# Patient Record
Sex: FEMALE | Race: WHITE | Employment: OTHER | URBAN - METROPOLITAN AREA
[De-identification: names, ages, dates, MRNs, and addresses within clinical notes are randomized per-mention and may not be internally consistent; named-entity substitution may affect disease eponyms.]

---

## 2022-08-03 PROBLEM — Z94.0 RENAL TRANSPLANT RECIPIENT: Status: ACTIVE | Noted: 2022-08-03

## 2022-08-03 PROBLEM — N18.30 CKD (CHRONIC KIDNEY DISEASE), STAGE III (HCC): Status: ACTIVE | Noted: 2022-08-03

## 2022-08-03 PROBLEM — I42.8 NON-ISCHEMIC CARDIOMYOPATHY (HCC): Status: ACTIVE | Noted: 2022-08-03

## 2022-08-03 PROBLEM — E11.42 DIABETIC POLYNEUROPATHY (HCC): Status: ACTIVE | Noted: 2022-08-03

## 2022-08-03 PROBLEM — I10 PRIMARY HYPERTENSION: Status: ACTIVE | Noted: 2022-08-03

## 2022-08-03 PROBLEM — E11.29 TYPE 2 DIABETES MELLITUS WITH RENAL COMPLICATION (HCC): Status: ACTIVE | Noted: 2022-08-03

## 2022-11-09 PROBLEM — N18.30 ANEMIA IN STAGE 3 CHRONIC KIDNEY DISEASE (HCC): Status: ACTIVE | Noted: 2022-11-09

## 2022-11-09 PROBLEM — D63.1 ANEMIA IN STAGE 3 CHRONIC KIDNEY DISEASE (HCC): Status: ACTIVE | Noted: 2022-11-09

## 2023-03-21 RX ORDER — TACROLIMUS 1 MG/1
1 CAPSULE ORAL 2 TIMES DAILY
Qty: 90 CAPSULE | Refills: 3 | OUTPATIENT
Start: 2023-03-21 | End: 2023-06-19

## 2023-10-04 PROBLEM — M81.0 AGE-RELATED OSTEOPOROSIS WITHOUT CURRENT PATHOLOGICAL FRACTURE: Status: ACTIVE | Noted: 2023-10-04

## 2024-02-08 ENCOUNTER — APPOINTMENT (OUTPATIENT)
Dept: GENERAL RADIOLOGY | Age: 69
End: 2024-02-08
Payer: COMMERCIAL

## 2024-02-08 ENCOUNTER — HOSPITAL ENCOUNTER (EMERGENCY)
Age: 69
Discharge: HOME | End: 2024-02-08
Attending: EMERGENCY MEDICINE
Payer: COMMERCIAL

## 2024-02-08 VITALS
HEART RATE: 80 BPM | RESPIRATION RATE: 14 BRPM | TEMPERATURE: 99 F | SYSTOLIC BLOOD PRESSURE: 108 MMHG | DIASTOLIC BLOOD PRESSURE: 50 MMHG | BODY MASS INDEX: 26.43 KG/M2 | OXYGEN SATURATION: 95 % | WEIGHT: 140 LBS | HEIGHT: 61 IN

## 2024-02-08 DIAGNOSIS — N30.01 ACUTE CYSTITIS WITH HEMATURIA: Primary | ICD-10-CM

## 2024-02-08 LAB
ALBUMIN SERPL-MCNC: 3.3 G/DL (ref 3.5–5.2)
ALBUMIN/GLOB SERPL: 1.7 {RATIO} (ref 1–2.5)
ALP SERPL-CCNC: 53 U/L (ref 35–104)
ALT SERPL-CCNC: 6 U/L (ref 5–33)
ANION GAP SERPL CALCULATED.3IONS-SCNC: 11 MMOL/L (ref 9–17)
AST SERPL-CCNC: 18 U/L
BACTERIA URNS QL MICRO: ABNORMAL
BASOPHILS # BLD: 0 K/UL (ref 0–0.2)
BASOPHILS NFR BLD: 0 % (ref 0–2)
BILIRUB SERPL-MCNC: 0.5 MG/DL (ref 0.3–1.2)
BILIRUB UR QL STRIP: NEGATIVE
BUN SERPL-MCNC: 21 MG/DL (ref 8–23)
CALCIUM SERPL-MCNC: 8 MG/DL (ref 8.6–10.4)
CHARACTER UR: ABNORMAL
CHLORIDE SERPL-SCNC: 105 MMOL/L (ref 98–107)
CLARITY UR: CLEAR
CO2 SERPL-SCNC: 25 MMOL/L (ref 20–31)
COLOR UR: YELLOW
CREAT SERPL-MCNC: 1.4 MG/DL (ref 0.5–0.9)
EOSINOPHIL # BLD: 0 K/UL (ref 0–0.4)
EOSINOPHILS RELATIVE PERCENT: 1 % (ref 1–4)
EPI CELLS #/AREA URNS HPF: ABNORMAL /HPF (ref 0–5)
ERYTHROCYTE [DISTWIDTH] IN BLOOD BY AUTOMATED COUNT: 14.1 % (ref 12.5–15.4)
FLUAV AG SPEC QL: NEGATIVE
FLUBV AG SPEC QL: NEGATIVE
GFR SERPL CREATININE-BSD FRML MDRD: 41 ML/MIN/1.73M2
GLUCOSE BLD-MCNC: 106 MG/DL (ref 65–105)
GLUCOSE SERPL-MCNC: 98 MG/DL (ref 70–99)
GLUCOSE UR STRIP-MCNC: NEGATIVE MG/DL
HCT VFR BLD AUTO: 29 % (ref 36–46)
HGB BLD-MCNC: 9.4 G/DL (ref 12–16)
HGB UR QL STRIP.AUTO: ABNORMAL
KETONES UR STRIP-MCNC: NEGATIVE MG/DL
LACTATE BLDV-SCNC: 2.3 MMOL/L (ref 0.5–2.2)
LEUKOCYTE ESTERASE UR QL STRIP: ABNORMAL
LYMPHOCYTES NFR BLD: 0.8 K/UL (ref 1–4.8)
LYMPHOCYTES RELATIVE PERCENT: 14 % (ref 24–44)
MAGNESIUM SERPL-MCNC: 1.5 MG/DL (ref 1.6–2.6)
MCH RBC QN AUTO: 27.7 PG (ref 26–34)
MCHC RBC AUTO-ENTMCNC: 32.6 G/DL (ref 31–37)
MCV RBC AUTO: 84.9 FL (ref 80–100)
MONOCYTES NFR BLD: 0.2 K/UL (ref 0.1–1.2)
MONOCYTES NFR BLD: 4 % (ref 2–11)
NEUTROPHILS NFR BLD: 81 % (ref 36–66)
NEUTS SEG NFR BLD: 4.5 K/UL (ref 1.8–7.7)
NITRITE UR QL STRIP: NEGATIVE
PH UR STRIP: 6 [PH] (ref 5–8)
PLATELET # BLD AUTO: 109 K/UL (ref 140–450)
PMV BLD AUTO: 8.1 FL (ref 6–12)
POTASSIUM SERPL-SCNC: 3.1 MMOL/L (ref 3.7–5.3)
PROT SERPL-MCNC: 5.2 G/DL (ref 6.4–8.3)
PROT UR STRIP-MCNC: ABNORMAL MG/DL
RBC # BLD AUTO: 3.41 M/UL (ref 4–5.2)
RBC #/AREA URNS HPF: ABNORMAL /HPF (ref 0–2)
SARS-COV-2 RDRP RESP QL NAA+PROBE: NOT DETECTED
SODIUM SERPL-SCNC: 141 MMOL/L (ref 135–144)
SP GR UR STRIP: 1.01 (ref 1–1.03)
SPECIMEN DESCRIPTION: NORMAL
UROBILINOGEN UR STRIP-ACNC: NORMAL EU/DL (ref 0–1)
WBC #/AREA URNS HPF: ABNORMAL /HPF (ref 0–5)
WBC OTHER # BLD: 5.5 K/UL (ref 3.5–11)

## 2024-02-08 PROCEDURE — 96368 THER/DIAG CONCURRENT INF: CPT

## 2024-02-08 PROCEDURE — 87804 INFLUENZA ASSAY W/OPTIC: CPT

## 2024-02-08 PROCEDURE — 81001 URINALYSIS AUTO W/SCOPE: CPT

## 2024-02-08 PROCEDURE — 6370000000 HC RX 637 (ALT 250 FOR IP)

## 2024-02-08 PROCEDURE — 2580000003 HC RX 258

## 2024-02-08 PROCEDURE — 83735 ASSAY OF MAGNESIUM: CPT

## 2024-02-08 PROCEDURE — 96361 HYDRATE IV INFUSION ADD-ON: CPT

## 2024-02-08 PROCEDURE — 87086 URINE CULTURE/COLONY COUNT: CPT

## 2024-02-08 PROCEDURE — 83605 ASSAY OF LACTIC ACID: CPT

## 2024-02-08 PROCEDURE — 85025 COMPLETE CBC W/AUTO DIFF WBC: CPT

## 2024-02-08 PROCEDURE — 2500000003 HC RX 250 WO HCPCS

## 2024-02-08 PROCEDURE — 82947 ASSAY GLUCOSE BLOOD QUANT: CPT

## 2024-02-08 PROCEDURE — 96366 THER/PROPH/DIAG IV INF ADDON: CPT

## 2024-02-08 PROCEDURE — 96375 TX/PRO/DX INJ NEW DRUG ADDON: CPT

## 2024-02-08 PROCEDURE — 99284 EMERGENCY DEPT VISIT MOD MDM: CPT

## 2024-02-08 PROCEDURE — 87186 SC STD MICRODIL/AGAR DIL: CPT

## 2024-02-08 PROCEDURE — 71045 X-RAY EXAM CHEST 1 VIEW: CPT

## 2024-02-08 PROCEDURE — 87088 URINE BACTERIA CULTURE: CPT

## 2024-02-08 PROCEDURE — 96365 THER/PROPH/DIAG IV INF INIT: CPT

## 2024-02-08 PROCEDURE — 87635 SARS-COV-2 COVID-19 AMP PRB: CPT

## 2024-02-08 PROCEDURE — A4216 STERILE WATER/SALINE, 10 ML: HCPCS

## 2024-02-08 PROCEDURE — 6360000002 HC RX W HCPCS

## 2024-02-08 PROCEDURE — 80053 COMPREHEN METABOLIC PANEL: CPT

## 2024-02-08 RX ORDER — LANOLIN ALCOHOL/MO/W.PET/CERES
400 CREAM (GRAM) TOPICAL ONCE
Status: COMPLETED | OUTPATIENT
Start: 2024-02-08 | End: 2024-02-08

## 2024-02-08 RX ORDER — ONDANSETRON 4 MG/1
4 TABLET, ORALLY DISINTEGRATING ORAL 3 TIMES DAILY PRN
Qty: 21 TABLET | Refills: 0 | Status: SHIPPED | OUTPATIENT
Start: 2024-02-08

## 2024-02-08 RX ORDER — 0.9 % SODIUM CHLORIDE 0.9 %
1000 INTRAVENOUS SOLUTION INTRAVENOUS ONCE
Status: COMPLETED | OUTPATIENT
Start: 2024-02-08 | End: 2024-02-08

## 2024-02-08 RX ORDER — POTASSIUM CHLORIDE 20 MEQ/1
40 TABLET, EXTENDED RELEASE ORAL ONCE
Status: COMPLETED | OUTPATIENT
Start: 2024-02-08 | End: 2024-02-08

## 2024-02-08 RX ORDER — ONDANSETRON 2 MG/ML
4 INJECTION INTRAMUSCULAR; INTRAVENOUS ONCE
Status: COMPLETED | OUTPATIENT
Start: 2024-02-08 | End: 2024-02-08

## 2024-02-08 RX ORDER — POTASSIUM CHLORIDE 7.45 MG/ML
10 INJECTION INTRAVENOUS
Status: COMPLETED | OUTPATIENT
Start: 2024-02-08 | End: 2024-02-08

## 2024-02-08 RX ORDER — CEPHALEXIN 250 MG/1
250 CAPSULE ORAL 4 TIMES DAILY
Qty: 40 CAPSULE | Refills: 0 | Status: SHIPPED | OUTPATIENT
Start: 2024-02-08 | End: 2024-02-18

## 2024-02-08 RX ADMIN — Medication 10 MEQ: at 13:50

## 2024-02-08 RX ADMIN — SODIUM CHLORIDE 1000 ML: 9 INJECTION, SOLUTION INTRAVENOUS at 13:49

## 2024-02-08 RX ADMIN — POTASSIUM CHLORIDE 40 MEQ: 1500 TABLET, EXTENDED RELEASE ORAL at 13:47

## 2024-02-08 RX ADMIN — Medication 400 MG: at 17:26

## 2024-02-08 RX ADMIN — FAMOTIDINE 20 MG: 10 INJECTION, SOLUTION INTRAVENOUS at 13:18

## 2024-02-08 RX ADMIN — Medication 10 MEQ: at 16:21

## 2024-02-08 RX ADMIN — ONDANSETRON 4 MG: 2 INJECTION INTRAMUSCULAR; INTRAVENOUS at 13:18

## 2024-02-08 RX ADMIN — CEFTRIAXONE SODIUM 1000 MG: 1 INJECTION, POWDER, FOR SOLUTION INTRAMUSCULAR; INTRAVENOUS at 17:28

## 2024-02-08 ASSESSMENT — ENCOUNTER SYMPTOMS
NAUSEA: 1
RESPIRATORY NEGATIVE: 1
VOMITING: 1
ALLERGIC/IMMUNOLOGIC NEGATIVE: 1
EYES NEGATIVE: 1
DIARRHEA: 1

## 2024-02-08 NOTE — ED PROVIDER NOTES
[facility] EMERGENCY DEPARTMENT  Emergency Department Encounter  Mid Level Provider     Pt Name: Alesia Cotton  MRN: [id number]  Birthdate 1955  Date of evaluation: 24  PCP:  Fernando Walker MD    CHIEF COMPLAINT       Chief Complaint   Patient presents with    Emesis    Diarrhea    Hypoglycemia     Hx diabetes; Vomiting and diarrhea since last night along w/ low blood suger.        HISTORY OF PRESENT ILLNESS  (Location/Symptom, Timing/Onset,Context/Setting, Quality, Duration, Modifying Factors, Severity.)      Alesia Cotton is a 68 y.o. female who presents with complaints of 24-hour onset of nausea, vomiting, diarrhea.  She does endorse that 2 days prior to this she did have some dysuria but she has not noticed today..  She reports history of type 1 diabetes and notes that her blood sugar has been running lower for her in the 50s to 60s she has had some associated hypotension as well which prompted the  to call EMS to have her checked out.  Prior to arrival she reports received approximately 1 L of normal saline and was also given some IV dextrose for hypoglycemia her blood sugar was in the 50s but came up into the upper 60s and 70s prior to her arrival.  Presently she denies any nausea just feels fatigued from her multiple episodes of vomiting and diarrhea over the past 24 hours.    She denies any associated chest pain, shortness of breath, abdominal pain, fevers, chills, headaches or vision changes.  PAST MEDICAL /SURGICAL / SOCIAL / FAMILY HISTORY      has a past medical history of Age-related osteoporosis without current pathological fracture, Anemia in stage 3 chronic kidney disease (HCC), CKD (chronic kidney disease), stage III (HCC),  donor renal transplant recipient, Diabetic polyneuropathy (HCC), Non-ischemic cardiomyopathy (HCC), Primary hypertension, Renal transplant recipient, and Type 2 diabetes mellitus with renal complication ([facility]).     has a past 
[facility] Emergency Department  [address]  [place] [address]  Phone: [phone]  Fax: [phone]      Attending Physician Attestation    I performed a history and physical examination of the patient and discussed management with the mid level provideer. I reviewed the mid level provider's note and agree with the documented findings and plan of care. Any areas of disagreement are noted on the chart. I was personally present for the key portions of any procedures. I have documented in the chart those procedures where I was not present during the key portions. I have reviewed the emergency nurses triage note. I agree with the chief complaint, past medical history, past surgical history, allergies, medications, social and family history as documented unless otherwise noted below. Documentation of the HPI, Physical Exam and Medical Decision Making performed by mid level providers is based on my personal performance of the HPI, PE and MDM. For Physician Assistant/ Nurse Practitioner cases/documentation I have personally evaluated this patient and have completed at least one if not all key elements of the E/M (history, physical exam, and MDM). Additional findings are as noted.      CHIEF COMPLAINT       Chief Complaint   Patient presents with    Emesis    Diarrhea    Hypoglycemia     Hx diabetes; Vomiting and diarrhea since last night along w/ low blood suger.          PAST MEDICAL HISTORY    has a past medical history of Age-related osteoporosis without current pathological fracture, Anemia in stage 3 chronic kidney disease (HCC), CKD (chronic kidney disease), stage III (HCC),  donor renal transplant recipient, Diabetic polyneuropathy (HCC), Non-ischemic cardiomyopathy (HCC), Primary hypertension, Renal transplant recipient, and Type 2 diabetes mellitus with renal complication (HCC).    SURGICAL HISTORY      has a past surgical history that includes Hip fracture surgery; Ankle 
eMERGENCY dEPARTMENT eNCOUnter   Independent Attestation     Pt Name: Alesia Cotton  MRN: [id number]  Birthdate 1955  Date of evaluation: 2/8/24     Alesia Cotton is a 68 y.o. female with CC: Emesis, Diarrhea, and Hypoglycemia (Hx diabetes; Vomiting and diarrhea since last night along w/ low blood suger. )      Based on the medical record the care appears appropriate.  I was personally available for consultation in the Emergency Department.    Misha Cope MD  Attending Emergency Physician                Misha Cope MD  02/08/24 [id number]    
no

## 2024-02-08 NOTE — DISCHARGE INSTRUCTIONS
Take your medication as indicated and prescribed.  If you are given an antibiotic then, make sure you get the prescription filled and take the antibiotics until finished.  Drink plenty of water while taking the antibiotics.  Avoid drinking alcohol or drinks that have caffeine in it while taking antibiotics.   If you were given the medication pyridium (or take over the counter Azo) - do not wear any contacts for the next week since this medication will turn your tears an orange color and will stain the contacts.      For pain use acetaminophen (Tylenol) or ibuprofen (Motrin / Advil), unless prescribed medications that have acetaminophen or ibuprofen (or similar medications) in it.  You can take over the counter acetaminophen tablets (1 - 2 tablets of the 500-mg strength every 6 hours) or ibuprofen tablets (2 tablets every 4 hours).    PLEASE RETURN TO THE EMERGENCY DEPARTMENT IMMEDIATELY for worsening symptoms, inability to urinate, worsening of blood in your urine, or if you develop any concerning symptoms such as: high fever not relieved by acetaminophen (Tylenol) and/or ibuprofen (Motrin / Advil), chills, shortness of breath, chest pain, feeling of your heart fluttering or racing, persistent nausea and/or vomiting, vomiting up blood, blood in your stool, loss of consciousness, numbness, weakness or tingling in the arms or legs or change in color of the extremities, changes in mental status, persistent headache, blurry vision, loss of bladder / bowel.

## 2024-02-10 LAB
MICROORGANISM SPEC CULT: ABNORMAL
SPECIMEN DESCRIPTION: ABNORMAL

## 2025-03-08 ENCOUNTER — CLINICAL DOCUMENTATION (OUTPATIENT)
Dept: NEPHROLOGY | Age: 70
End: 2025-03-08

## 2025-03-08 NOTE — PROGRESS NOTES
Discussed with patient low potassium levels.  She believes they were drawn more when she had the acute GI illness.  She believes her oral intake has improved she is gained some weight and expects her potassium to have improved as well.  She is going get lab work done this week.  Will review that and decide if potassium supplements are needed.  In the meantime she will continue magnesium supplements as before.

## 2025-03-21 PROBLEM — B25.8 OTHER CYTOMEGALOVIRAL DISEASES (HCC): Status: ACTIVE | Noted: 2025-03-21

## 2025-07-11 ENCOUNTER — CLINICAL DOCUMENTATION (OUTPATIENT)
Dept: NEPHROLOGY | Age: 70
End: 2025-07-11

## 2025-07-11 DIAGNOSIS — Z94.0 RENAL TRANSPLANT RECIPIENT: Primary | ICD-10-CM

## 2025-07-11 NOTE — PROGRESS NOTES
Labs 7/9/2025 tacrolimus level 9.1.  Will repeated next week.  Continue current dose of 0.5 mg at bedtime and 1 mg in the morning.  Same discussed with her.

## 2025-07-20 RX ORDER — TACROLIMUS 0.5 MG/1
0.5 CAPSULE ORAL 2 TIMES DAILY
Qty: 180 CAPSULE | Refills: 3 | Status: SHIPPED | OUTPATIENT
Start: 2025-07-20 | End: 2026-07-15

## 2025-08-19 ENCOUNTER — HOSPITAL ENCOUNTER (OUTPATIENT)
Dept: ULTRASOUND IMAGING | Age: 70
Discharge: HOME | End: 2025-08-21
Attending: INTERNAL MEDICINE
Payer: COMMERCIAL

## 2025-08-19 ENCOUNTER — HOSPITAL ENCOUNTER (OUTPATIENT)
Age: 70
Setting detail: SPECIMEN
Discharge: HOME | End: 2025-08-19

## 2025-08-19 DIAGNOSIS — N18.30 STAGE 3 CHRONIC KIDNEY DISEASE, UNSPECIFIED WHETHER STAGE 3A OR 3B CKD (HCC): ICD-10-CM

## 2025-08-19 DIAGNOSIS — E11.21 TYPE 2 DIABETES MELLITUS WITH DIABETIC NEPHROPATHY, UNSPECIFIED WHETHER LONG TERM INSULIN USE (HCC): ICD-10-CM

## 2025-08-19 DIAGNOSIS — B25.9 CYTOMEGALOVIRUS INFECTION, UNSPECIFIED CYTOMEGALOVIRAL INFECTION TYPE (HCC): ICD-10-CM

## 2025-08-19 DIAGNOSIS — R60.0 LOCALIZED EDEMA: ICD-10-CM

## 2025-08-19 DIAGNOSIS — E83.42 HYPOMAGNESEMIA: ICD-10-CM

## 2025-08-19 DIAGNOSIS — Z94.0 RENAL TRANSPLANT RECIPIENT: ICD-10-CM

## 2025-08-19 PROCEDURE — 76705 ECHO EXAM OF ABDOMEN: CPT

## 2025-08-21 LAB — TACROLIMUS BLD-MCNC: 5 NG/ML

## 2025-08-22 LAB
CMV QNT BY NAAT, PLASMA INTERP: NOT DETECTED
CMV QNT BY NAAT, PLASMA IU/ML: NOT DETECTED IU/ML
CMV QNT BY NAAT, PLASMA LOG IU/ML: NOT DETECTED LOG IU/ML